# Patient Record
Sex: FEMALE
[De-identification: names, ages, dates, MRNs, and addresses within clinical notes are randomized per-mention and may not be internally consistent; named-entity substitution may affect disease eponyms.]

---

## 2022-09-14 ENCOUNTER — HOSPITAL ENCOUNTER (INPATIENT)
Dept: HOSPITAL 19 - INPTSU | Age: 27
LOS: 31 days | Discharge: HOME | DRG: 621 | End: 2022-10-15
Attending: SURGERY | Admitting: SURGERY
Payer: OTHER GOVERNMENT

## 2022-09-14 VITALS — HEIGHT: 67 IN | WEIGHT: 293 LBS | BODY MASS INDEX: 45.99 KG/M2

## 2022-09-14 DIAGNOSIS — G47.33: ICD-10-CM

## 2022-09-14 DIAGNOSIS — F32.A: ICD-10-CM

## 2022-09-14 DIAGNOSIS — E66.01: Primary | ICD-10-CM

## 2022-09-14 DIAGNOSIS — F41.9: ICD-10-CM

## 2022-09-14 DIAGNOSIS — E28.2: ICD-10-CM

## 2022-09-14 DIAGNOSIS — J45.909: ICD-10-CM

## 2022-09-14 DIAGNOSIS — E11.9: ICD-10-CM

## 2022-09-14 PROCEDURE — A4314 CATH W/DRAINAGE 2-WAY LATEX: HCPCS

## 2022-09-14 PROCEDURE — C9113 INJ PANTOPRAZOLE SODIUM, VIA: HCPCS

## 2022-10-12 VITALS — DIASTOLIC BLOOD PRESSURE: 67 MMHG | SYSTOLIC BLOOD PRESSURE: 126 MMHG | HEART RATE: 88 BPM

## 2022-10-12 VITALS — TEMPERATURE: 97.3 F | SYSTOLIC BLOOD PRESSURE: 126 MMHG | DIASTOLIC BLOOD PRESSURE: 76 MMHG | HEART RATE: 79 BPM

## 2022-10-12 VITALS — HEART RATE: 91 BPM | DIASTOLIC BLOOD PRESSURE: 68 MMHG | SYSTOLIC BLOOD PRESSURE: 131 MMHG

## 2022-10-12 VITALS — HEART RATE: 89 BPM | SYSTOLIC BLOOD PRESSURE: 111 MMHG | TEMPERATURE: 98.3 F | DIASTOLIC BLOOD PRESSURE: 56 MMHG

## 2022-10-12 VITALS — DIASTOLIC BLOOD PRESSURE: 58 MMHG | TEMPERATURE: 98.2 F | HEART RATE: 83 BPM | SYSTOLIC BLOOD PRESSURE: 118 MMHG

## 2022-10-12 VITALS — DIASTOLIC BLOOD PRESSURE: 56 MMHG | SYSTOLIC BLOOD PRESSURE: 111 MMHG | HEART RATE: 89 BPM

## 2022-10-12 VITALS — SYSTOLIC BLOOD PRESSURE: 173 MMHG | DIASTOLIC BLOOD PRESSURE: 65 MMHG | HEART RATE: 96 BPM

## 2022-10-12 PROCEDURE — 8E0W4CZ ROBOTIC ASSISTED PROCEDURE OF TRUNK REGION, PERCUTANEOUS ENDOSCOPIC APPROACH: ICD-10-PCS | Performed by: SURGERY

## 2022-10-12 PROCEDURE — 0D164ZA BYPASS STOMACH TO JEJUNUM, PERCUTANEOUS ENDOSCOPIC APPROACH: ICD-10-PCS | Performed by: SURGERY

## 2022-10-12 NOTE — NUR
PT A&OX4 RESTING IN BED,  AT BEDSIDE. PT DENIES PN. MEDS GIVEN AND
ASSESSMENT COMPLETE. SHAW TO DD W YELLOW URINE OUTPUT. FLUIDS INSUING IN LT
AC. X6 LAP SITES CDI. NO NEEDS AT THIS TIME. CALL LIGHT WITHIN REACH.

## 2022-10-12 NOTE — NUR
PATIENT UP TO FLOOR AROUND 1100. PATIENT COMPLAINS OF NAUSEA. ZOFRAN GIVEN AT
1323. PATIENT DENIES ANY EMESIS. PCA PUMP ADMINISTERED. SURGICAL SITES X6 CDI
WITH BANDAIDS. IV TO LEFT AC WITH D5 1/2 NS AT 125ML/HOUR. PATIENT NPO. SHAW
TO DD WITH OFELIA OUTPUT. SHAW POSITIONAL, CHANGED STAT LOCK. PATIENT
AMBULATED HALLS. PATIENT RESTING IN BED WITH CALL LIGHT NEAR.

## 2022-10-12 NOTE — NUR
The patient ambulated back to [place] [address] independently using a steady gait and
appeared to tolerate the activity well. Vital signs obtained. Consent signed.
18G IV started in left forearm on third attempt, LR Infusing without
difficulty. Assessment completed. Medications reconcilled.  at bedside
at this time. Call light is within reach. Warm blanket provided. Denies any
further needs.

## 2022-10-12 NOTE — NUR
The patient was taken over to the recovery room to have a nerve block placed
prior to surgery. The patient's chart was sent with her. The patient's
belongings were taken over to the recovery room and will be transferred with
her to her room post operatively.

## 2022-10-12 NOTE — NUR
The patient ambulated back to [place] [address] independently using a steady gait and
appeared to tolerate the activity well. Vital signs obtained. Consent signed.
18G IV started in left forearm on third attempt, LR Infusing without
difficulty. Assessment completed. Medications reconcilled.  at bedside
at this time. Call light is within reach. Warm blanket provided. Denies any
further needs.

## 2022-10-13 VITALS — TEMPERATURE: 98.1 F | HEART RATE: 69 BPM | DIASTOLIC BLOOD PRESSURE: 65 MMHG | SYSTOLIC BLOOD PRESSURE: 116 MMHG

## 2022-10-13 VITALS — SYSTOLIC BLOOD PRESSURE: 111 MMHG | DIASTOLIC BLOOD PRESSURE: 61 MMHG | HEART RATE: 83 BPM | TEMPERATURE: 98.4 F

## 2022-10-13 VITALS — DIASTOLIC BLOOD PRESSURE: 66 MMHG | HEART RATE: 72 BPM | SYSTOLIC BLOOD PRESSURE: 116 MMHG | TEMPERATURE: 98 F

## 2022-10-13 VITALS — SYSTOLIC BLOOD PRESSURE: 106 MMHG | DIASTOLIC BLOOD PRESSURE: 65 MMHG | TEMPERATURE: 98.5 F | HEART RATE: 78 BPM

## 2022-10-13 VITALS — DIASTOLIC BLOOD PRESSURE: 52 MMHG | HEART RATE: 73 BPM | SYSTOLIC BLOOD PRESSURE: 105 MMHG

## 2022-10-13 VITALS — HEART RATE: 83 BPM | DIASTOLIC BLOOD PRESSURE: 58 MMHG | SYSTOLIC BLOOD PRESSURE: 118 MMHG

## 2022-10-13 VITALS — HEART RATE: 73 BPM | SYSTOLIC BLOOD PRESSURE: 105 MMHG | DIASTOLIC BLOOD PRESSURE: 62 MMHG | TEMPERATURE: 98.2 F

## 2022-10-13 VITALS — SYSTOLIC BLOOD PRESSURE: 105 MMHG | TEMPERATURE: 98.1 F | DIASTOLIC BLOOD PRESSURE: 52 MMHG | HEART RATE: 73 BPM

## 2022-10-13 VITALS — TEMPERATURE: 99 F

## 2022-10-13 LAB
ALBUMIN SERPL-MCNC: 3.9 GM/DL (ref 3.5–5)
ALP SERPL-CCNC: 64 U/L (ref 40–150)
ALT SERPL-CCNC: 33 U/L (ref 0–55)
ANION GAP SERPL CALC-SCNC: 10 MMOL/L (ref 7–16)
AST SERPL-CCNC: 19 U/L (ref 5–34)
BASOPHILS # BLD: 0 K/MM3 (ref 0–0.2)
BASOPHILS NFR BLD AUTO: 0.2 % (ref 0–2)
BILIRUB SERPL-MCNC: 0.4 MG/DL (ref 0.2–1.2)
BUN SERPL-MCNC: 12 MG/DL (ref 7–19)
CALCIUM SERPL-MCNC: 9.3 MG/DL (ref 8.4–10.2)
CHLORIDE SERPL-SCNC: 108 MMOL/L (ref 98–107)
CO2 SERPL-SCNC: 22 MMOL/L (ref 22–29)
CREAT SERPL-SCNC: 0.95 MG/DL (ref 0.57–1.11)
EOSINOPHIL # BLD: 0 K/MM3 (ref 0–0.7)
EOSINOPHIL NFR BLD: 0 % (ref 0–4)
ERYTHROCYTE [DISTWIDTH] IN BLOOD BY AUTOMATED COUNT: 13.2 % (ref 11.5–14.5)
GLUCOSE SERPL-MCNC: 129 MG/DL (ref 70–99)
GRANULOCYTES # BLD AUTO: 79.4 % (ref 42.2–75.2)
HCT VFR BLD AUTO: 42.2 % (ref 37–47)
HGB BLD-MCNC: 14.2 G/DL (ref 12.5–16)
LYMPHOCYTES # BLD: 2.1 K/MM3 (ref 1.2–3.4)
LYMPHOCYTES NFR BLD: 12.8 % (ref 20–51)
MCH RBC QN AUTO: 30 PG (ref 27–31)
MCHC RBC AUTO-ENTMCNC: 34 G/DL (ref 33–37)
MCV RBC AUTO: 89 FL (ref 80–100)
MONOCYTES # BLD: 1.2 K/MM3 (ref 0.1–0.6)
MONOCYTES NFR BLD AUTO: 7.2 % (ref 1.7–9.3)
NEUTROPHILS # BLD: 12.8 K/MM3 (ref 1.4–6.5)
PLATELET # BLD AUTO: 250 K/MM3 (ref 130–400)
PMV BLD AUTO: 11.2 FL (ref 7.4–10.4)
POTASSIUM SERPL-SCNC: 4.5 MMOL/L (ref 3.5–4.5)
PROT SERPL-MCNC: 7 GM/DL (ref 6.2–8.1)
RBC # BLD AUTO: 4.72 M/MM3 (ref 4.1–5.3)
SODIUM SERPL-SCNC: 140 MMOL/L (ref 136–145)

## 2022-10-13 NOTE — NUR
PT A&OX4 RESTING IN BED. PT REPORTS FEELING NAUSEAS AND HAVING PN 7/10 IN
LOWER ABDOMEN. X6 LAP SITES CDI. PT NOT TOLERATING FLUIDS. ASSESSMENT
COMPLETE. SCDS APPLIED TO BLE. FLUIDS INFUSING AT 75 ML/HR IN LT AC. CALL
LIGHT WITHIN REACH.

## 2022-10-13 NOTE — NUR
Initial visit; Patient thanked  for offering prayer and God's
blessings for a successful journey to better health following her surgical
procedure.

## 2022-10-13 NOTE — NUR
Patient c/o of pain at lower abdomen. Patient rated pain level 8/10 and
describes pain as sharp. Patient reports of feeling nauseous, roxicodone
administered for pain and zofran given for nausea. Family at the bedside.

## 2022-10-13 NOTE — NUR
Patient awake in bed, alert and oriented. PCA pump on, IVF infusing without
difficulty. 6 bandaid in abdomen intact. Patient denies of pain at time. See
process intervention for notes.

## 2022-10-14 VITALS — TEMPERATURE: 98.7 F | HEART RATE: 81 BPM | SYSTOLIC BLOOD PRESSURE: 115 MMHG | DIASTOLIC BLOOD PRESSURE: 77 MMHG

## 2022-10-14 VITALS — DIASTOLIC BLOOD PRESSURE: 65 MMHG | TEMPERATURE: 98.6 F | SYSTOLIC BLOOD PRESSURE: 118 MMHG | HEART RATE: 83 BPM

## 2022-10-14 VITALS — HEART RATE: 81 BPM | DIASTOLIC BLOOD PRESSURE: 69 MMHG | SYSTOLIC BLOOD PRESSURE: 118 MMHG | TEMPERATURE: 98.6 F

## 2022-10-14 VITALS — TEMPERATURE: 99 F | SYSTOLIC BLOOD PRESSURE: 124 MMHG | HEART RATE: 87 BPM | DIASTOLIC BLOOD PRESSURE: 62 MMHG

## 2022-10-14 VITALS — SYSTOLIC BLOOD PRESSURE: 110 MMHG | DIASTOLIC BLOOD PRESSURE: 60 MMHG | HEART RATE: 74 BPM | TEMPERATURE: 98.6 F

## 2022-10-14 VITALS — TEMPERATURE: 98.2 F | SYSTOLIC BLOOD PRESSURE: 123 MMHG | HEART RATE: 75 BPM | DIASTOLIC BLOOD PRESSURE: 63 MMHG

## 2022-10-14 NOTE — NUR
CARLITO met with pt(  Sushant present # [phone]) to complete intake. Pt
lives in [place], but will be moving on [facility] in 12/1/22. Pt is
independent on all ADLS and does not use any DME. PCP is at [facility] and
gets medications from [facility].  Pt did not have a DPOA-HC
and agreed to complete one. CARLITO witness and made copies for pt and put on in
Chart. No other needs at this time.
DPAO 1st agent: , Jose estrada 2nd: Sister Magda FLETCHEREfren
 
DC: Home

## 2022-10-14 NOTE — NUR
PT ASKING FOR PAIN MEDS, RATES PAIN 4/10 TO ABD. PT HAS BEEN TAKING ZOFRAN IV
WHEN SHE TAKES PAIN MEDS, MEDICATED WITH ZOFRAN 4MG IVP AND OXYCODONE 5MG PO
AT THIS TIME. PT REPORTS VOIDING WELL AND PASSING FLATUS. TRIES CHOCOLATE
PUDDING AT THIS TIME AS WELL. KPAD TO ABD. LAP SITES X6.

## 2022-10-14 NOTE — NUR
Follow-up visit; Lyssa is experiencing some pain and thanked  for
coming in and letting her know she is in 's thoughts and prayers since
yesterday and  prays for a rapid and thorough recovery.

## 2022-10-14 NOTE — NUR
Patient laying in bed alert and oriented. Patient report of pain at lower
abdomen. Patient rated pain level 6/10. Roxicodone administered. 6 bandaid
dressing at abdomen intact. Will continue monitoring patient. Family at the
bedside.

## 2022-10-14 NOTE — NUR
CARLITO met with pt(  Sushant present # [phone]) to complete intake. Pt
lives in [place], but will be moving on [facility] in 12/1/22. Pt is
independent on all ADLS and does not use any DME. PCP is at [facility] and
gets medications from [facility].  Pt did not have a DPOA-HC
and agreed to complete one. CARLITO witness and made copies for pt and put on in
Chart. No other needs at this time.
DPAO 1st agent: , Jose estrada 2nd: Sister Magda FLETCHEREfren
 
DC: Home

## 2022-10-15 VITALS — HEART RATE: 72 BPM | SYSTOLIC BLOOD PRESSURE: 124 MMHG | DIASTOLIC BLOOD PRESSURE: 60 MMHG | TEMPERATURE: 98.4 F

## 2022-10-15 VITALS — TEMPERATURE: 98.5 F | DIASTOLIC BLOOD PRESSURE: 69 MMHG | SYSTOLIC BLOOD PRESSURE: 116 MMHG | HEART RATE: 79 BPM

## 2022-10-15 NOTE — NUR
Patient ready for discharge.  rounded. Discharge orders obtained.
Patient requested zofran for home, Orders obtained and called into [facility].
We reviewed scripts send to pharmacy & medication safety. Patient verbalized
understanding of following strictly her via [place] light diet. denies
questions about it. We reviewed incisions cares & signs & syumptoms to call
or seek medication attention. activity reviewed. INT. Patient dressed & ready
to get home. Ambulated out with all belongigns.

## 2022-10-15 NOTE — NUR
Patient has been up ambulating halls with supportive significant
other. She has a breakfast tray and has ate a few bites, reports feeling full,
but denies nausea or pain. Discussed with her the importance of making sure
her intake has nutritional value & protien. Encouraged Ensure shake vs the
pudding or tea.  Incisions open to air, edges well approximated. She
has passed flatus. Iv to INT. Will monitor.

## 2022-10-15 NOTE — NUR
Patient ready for discharge.  rounded. Discharge orders obtained.
Patient requested zofran for home, Orders obtained and called into [facility].
We reviewed scripts send to pharmacy & medication safety. Patient verbalized
understanding of following strictly her via fel[place] light diet. denies
questions about it. We reviewed incisions cares & signs & syumptoms to call
or seek medication attention. activity reviewed. INT. Patient dressed & ready
to get home. Ambulated out with all belongigns.

## 2023-05-10 ENCOUNTER — HOSPITAL ENCOUNTER (OUTPATIENT)
Dept: HOSPITAL 19 - COL.RAD | Age: 28
End: 2023-05-10
Attending: HOSPITAL
Payer: OTHER GOVERNMENT

## 2023-05-10 DIAGNOSIS — E28.2: Primary | ICD-10-CM

## 2023-05-10 DIAGNOSIS — N97.0: ICD-10-CM
